# Patient Record
Sex: FEMALE | Race: BLACK OR AFRICAN AMERICAN | NOT HISPANIC OR LATINO
[De-identification: names, ages, dates, MRNs, and addresses within clinical notes are randomized per-mention and may not be internally consistent; named-entity substitution may affect disease eponyms.]

---

## 2021-12-05 ENCOUNTER — NON-APPOINTMENT (OUTPATIENT)
Age: 46
End: 2021-12-05

## 2021-12-15 ENCOUNTER — APPOINTMENT (OUTPATIENT)
Dept: OPHTHALMOLOGY | Facility: CLINIC | Age: 46
End: 2021-12-15
Payer: COMMERCIAL

## 2021-12-15 ENCOUNTER — NON-APPOINTMENT (OUTPATIENT)
Age: 46
End: 2021-12-15

## 2021-12-15 PROCEDURE — 92004 COMPRE OPH EXAM NEW PT 1/>: CPT

## 2021-12-15 PROCEDURE — 92025 CPTRIZED CORNEAL TOPOGRAPHY: CPT

## 2021-12-15 PROCEDURE — 92136 OPHTHALMIC BIOMETRY: CPT

## 2021-12-28 ENCOUNTER — TRANSCRIPTION ENCOUNTER (OUTPATIENT)
Age: 46
End: 2021-12-28

## 2022-01-07 PROBLEM — Z00.00 ENCOUNTER FOR PREVENTIVE HEALTH EXAMINATION: Status: ACTIVE | Noted: 2022-01-07

## 2022-01-09 ENCOUNTER — NON-APPOINTMENT (OUTPATIENT)
Age: 47
End: 2022-01-09

## 2022-01-10 ENCOUNTER — APPOINTMENT (OUTPATIENT)
Dept: OPHTHALMOLOGY | Facility: CLINIC | Age: 47
End: 2022-01-10
Payer: COMMERCIAL

## 2022-01-10 ENCOUNTER — NON-APPOINTMENT (OUTPATIENT)
Age: 47
End: 2022-01-10

## 2022-01-10 PROCEDURE — 76512 OPH US DX B-SCAN: CPT | Mod: LT

## 2022-01-10 PROCEDURE — 92014 COMPRE OPH EXAM EST PT 1/>: CPT

## 2022-04-04 NOTE — ASU PATIENT PROFILE, ADULT - FALL HARM RISK - UNIVERSAL INTERVENTIONS
Bed in lowest position, wheels locked, appropriate side rails in place/Call bell, personal items and telephone in reach/Instruct patient to call for assistance before getting out of bed or chair/Non-slip footwear when patient is out of bed/[place] to call system/Physically safe environment - no spills, clutter or unnecessary equipment/Purposeful Proactive Rounding/Room/bathroom lighting operational, light cord in reach

## 2022-04-04 NOTE — ASU PATIENT PROFILE, ADULT - NSICDXPASTMEDICALHX_GEN_ALL_CORE_FT
PAST MEDICAL HISTORY:  Hepatitis-C Past History    Hypercholesterolemia     Migraines     Vitamin D deficiency

## 2022-04-04 NOTE — ASU PATIENT PROFILE, ADULT - NSICDXPASTSURGICALHX_GEN_ALL_CORE_FT
PAST SURGICAL HISTORY:  No significant past surgical history PAST SURGICAL HISTORY:  H/O colonoscopy

## 2022-04-04 NOTE — ASU PATIENT PROFILE, ADULT - VISION (WITH CORRECTIVE LENSES IF THE PATIENT USUALLY WEARS THEM):
Severely impaired: cannot locate objects without hearing or touching them or patient nonresponsive. Normal vision: sees adequately in most situations; can see medication labels, newsprint Partially impaired: cannot see medication labels or newsprint, but can see obstacles in path, and the surrounding layout; can count fingers at arm's length

## 2022-04-05 ENCOUNTER — OUTPATIENT (OUTPATIENT)
Dept: OUTPATIENT SERVICES | Facility: HOSPITAL | Age: 47
LOS: 1 days | Discharge: HOME | End: 2022-04-05
Payer: COMMERCIAL

## 2022-04-05 ENCOUNTER — NON-APPOINTMENT (OUTPATIENT)
Age: 47
End: 2022-04-05

## 2022-04-05 ENCOUNTER — APPOINTMENT (OUTPATIENT)
Dept: OPHTHALMOLOGY | Facility: AMBULATORY SURGERY CENTER | Age: 47
End: 2022-04-05

## 2022-04-05 VITALS
SYSTOLIC BLOOD PRESSURE: 138 MMHG | DIASTOLIC BLOOD PRESSURE: 77 MMHG | WEIGHT: 135.58 LBS | HEIGHT: 65 IN | TEMPERATURE: 98 F | RESPIRATION RATE: 16 BRPM | OXYGEN SATURATION: 100 % | HEART RATE: 85 BPM

## 2022-04-05 VITALS
SYSTOLIC BLOOD PRESSURE: 123 MMHG | OXYGEN SATURATION: 97 % | TEMPERATURE: 97 F | RESPIRATION RATE: 16 BRPM | DIASTOLIC BLOOD PRESSURE: 75 MMHG

## 2022-04-05 DIAGNOSIS — Z98.890 OTHER SPECIFIED POSTPROCEDURAL STATES: Chronic | ICD-10-CM

## 2022-04-05 PROCEDURE — 66984 XCAPSL CTRC RMVL W/O ECP: CPT | Mod: RT

## 2022-04-05 PROCEDURE — 6698F: CPT | Mod: RT

## 2022-04-05 DEVICE — IMPLANTABLE DEVICE
Type: IMPLANTABLE DEVICE | Site: RIGHT | Status: NON-FUNCTIONAL
Removed: 2022-04-05

## 2022-04-05 RX ORDER — ERGOCALCIFEROL 1.25 MG/1
1 CAPSULE ORAL
Qty: 0 | Refills: 0 | DISCHARGE

## 2022-04-05 RX ORDER — IBUPROFEN 200 MG
0 TABLET ORAL
Qty: 0 | Refills: 0 | DISCHARGE

## 2022-04-05 RX ORDER — PHENYLEPHRINE HCL 2.5 %
1 DROPS OPHTHALMIC (EYE)
Refills: 0 | Status: COMPLETED | OUTPATIENT
Start: 2022-04-05 | End: 2022-04-05

## 2022-04-05 RX ORDER — ACETAMINOPHEN 500 MG
650 TABLET ORAL ONCE
Refills: 0 | Status: DISCONTINUED | OUTPATIENT
Start: 2022-04-05 | End: 2022-04-05

## 2022-04-05 RX ORDER — OFLOXACIN 0.3 %
1 DROPS OPHTHALMIC (EYE)
Refills: 0 | Status: COMPLETED | OUTPATIENT
Start: 2022-04-05 | End: 2022-04-05

## 2022-04-05 RX ORDER — ASCORBIC ACID 60 MG
1 TABLET,CHEWABLE ORAL
Qty: 0 | Refills: 0 | DISCHARGE

## 2022-04-05 RX ORDER — ONDANSETRON 8 MG/1
4 TABLET, FILM COATED ORAL ONCE
Refills: 0 | Status: DISCONTINUED | OUTPATIENT
Start: 2022-04-05 | End: 2022-04-05

## 2022-04-05 RX ORDER — TROPICAMIDE 1 %
1 DROPS OPHTHALMIC (EYE)
Refills: 0 | Status: COMPLETED | OUTPATIENT
Start: 2022-04-05 | End: 2022-04-05

## 2022-04-05 RX ADMIN — Medication 1 DROP(S): at 09:15

## 2022-04-05 RX ADMIN — Medication 1 DROP(S): at 09:25

## 2022-04-05 RX ADMIN — Medication 1 DROP(S): at 09:20

## 2022-04-05 RX ADMIN — Medication 1 DROP(S): at 09:16

## 2022-04-05 NOTE — OPERATIVE REPORT - OPERATIVE RPOSRT DETAILS
PATIENT: YO PUTNAM	    ACCOUNT  NUMBER:  [id number]     OPERATING SURGEON:  Dr. Tamir Campos	    ASSISTANT SURGEON:  Dr. Anabell Hicks    DATE OF SURGERY: 4/5/22    ANESTHESIA:  MAC/TOPICAL	    ESTIMATED BLOOD LOSS: < 1mL	    COMPLICATIONS:  None		    PREOPERATIVE DIAGNOSIS:  Cataract,  Right eye    POSTOPERATIVE DIAGNOSIS:  Cataract, Right eye    OPERATIVE PROCEDURE:  1. Femtosecond laser assisted laser surgery        Right eye  2. Phacoemulsification with insertion of posterior chamber intraocular lens  Right eye    LENS IMPLANT: [id number] +8.5    PROCEDURE:	The patient was brought to the laser room where certain aspects of the procedure were performed with the femtosecond laser.     The patient was then brought into the operating room and placed supine on the operating room table. After uneventful induction of neuroleptic anesthesia, lidocaine gel was instilled into the operative eye achieving sufficient anesthesia.  The patient was then prepped and draped in the usual sterile fashion.  A wire lid speculum was then inserted into the operative eye giving a wide palpebral fissure.      	A dima knife was used to perform paracenteses through clear cornea at the 12 and 6 o’clock limbal positions.  The anterior chamber was irrigated with lidocaine 1% nonpreserved followed by epinephrine 0.1% nonpreserved.  Viscoelastic was then used to maintain the anterior chamber.  A keratome was used to create a clear corneal incision just inside the temporal limbus.  Capsulorhexis forceps were used to complete the capsulorhexis. Balanced salt solution was used to hydrodissect the nucleus.  The [employer] phacoemulsification unit was then used to completely phacoemulsify the nucleus, following which an aspirating handpiece was used to aspirate all cortical remnants from the capsular bag.  Viscoelastic was  used to reform the anterior chamber and capsular bag.      	A new foldable posterior chamber intraocular lens was brought into the surgical field.  It was folded and directly injected into the capsular bag following which it was centered and secure.  All viscoelastic was then aspirated from the anterior segment using an aspirating handpiece.  All wounds were checked for leaks and there were none.  A solution consisting of 4 mg dexamethasone, 100 mg cefazolin was used to irrigate the surface of the eye.  The lid speculum was removed from the eye and a shield placed over the eye.      	The patient tolerated the procedure well and went to the recovery area in good condition.       PATIENT: YO PUTNAM	    ACCOUNT  NUMBER:  [id number]     OPERATING SURGEON:  Dr. Tamir Campos	    ASSISTANT SURGEON:  Dr. Anabell Hicks    DATE OF SURGERY: 4/5/22    ANESTHESIA:  MAC/TOPICAL	    ESTIMATED BLOOD LOSS: < 1mL	    COMPLICATIONS:  None		    PREOPERATIVE DIAGNOSIS:  Polar Cataract,  Right eye    POSTOPERATIVE DIAGNOSIS:  Polar Cataract, Right eye    OPERATIVE PROCEDURE:  1. Femtosecond laser assisted laser surgery        Right eye  2. Phacoemulsification with insertion of sulcus intraocular lens  Right eye    LENS IMPLANT: CT Kathrin 602 +7.5    PROCEDURE:	The patient was brought to the laser room where certain aspects of the procedure were performed with the femtosecond laser.     The patient was then brought into the operating room and placed supine on the operating room table. After uneventful induction of neuroleptic anesthesia, lidocaine gel was instilled into the operative eye achieving sufficient anesthesia.  The patient was then prepped and draped in the usual sterile fashion.  A wire lid speculum was then inserted into the operative eye giving a wide palpebral fissure.      	A dima knife was used to perform paracenteses through clear cornea at the 12 and 6 o’clock limbal positions.  The anterior chamber was irrigated with lidocaine 1% nonpreserved followed by epinephrine 0.1% nonpreserved.  Viscoelastic was then used to maintain the anterior chamber.  A keratome was used to create a clear corneal incision just inside the temporal limbus.  Capsulorhexis forceps were used to complete the capsulorhexis. Balanced salt solution was used to hydrodelineate the nucleus.  The [employer] phacoemulsification unit was then used to completely phacoemulsify the nucleus, following which an aspirating handpiece was used to aspirate all cortical remnants from the capsular bag. There was noted to be a posterior capsular tear, as such the decision was made to place a sulcus lens. Viscoelastic was  used to reform the anterior chamber and capsular bag. A keratome was used to slightly enlarge the main wound.    	A new foldable sulcus intraocular lens was brought into the surgical field.  It was folded and directly injected into the sulcus following which it was centered and secure.  All viscoelastic was then aspirated from the anterior segment using an aspirating handpiece. A 10-0 nylon suture was placed at the wound. All wounds were checked for leaks and there were none. No vitreous was noted at the wounds or within the anterior chamber. Miochol was instilled into the anterior chamber with notable constriction of the iris. A solution consisting of 4 mg dexamethasone, 100 mg cefazolin was used to irrigate the surface of the eye.  The lid speculum was removed from the eye and a shield placed over the eye.      	The patient tolerated the procedure well and went to the recovery area in good condition.       PATIENT: YO PUTNAM	    ACCOUNT  NUMBER:  [id number]     OPERATING SURGEON:  Dr. Tamir Campos	    ASSISTANT SURGEON:  Dr. Anabell Hicks    DATE OF SURGERY: 4/5/22    ANESTHESIA:  MAC/TOPICAL	    ESTIMATED BLOOD LOSS: < 1mL	    COMPLICATIONS:  None		    PREOPERATIVE DIAGNOSIS:  Polar Cataract,  Right eye    POSTOPERATIVE DIAGNOSIS:  Polar Cataract, Right eye    OPERATIVE PROCEDURE:  1. Femtosecond laser assisted laser surgery        Right eye  2. Phacoemulsification with insertion of sulcus intraocular lens  Right eye    LENS IMPLANT: CT Kathrin 602 +7.5    PROCEDURE:	The patient was brought to the laser room where the anterior capsulotomy and arcuate incisions were performed with the femtosecond laser.     The patient was then brought into the operating room and placed supine on the operating room table. After uneventful induction of neuroleptic anesthesia, lidocaine gel was instilled into the operative eye achieving sufficient anesthesia.  The patient was then prepped and draped in the usual sterile fashion.  A wire lid speculum was then inserted into the operative eye giving a wide palpebral fissure.      	A dima knife was used to perform paracenteses through clear cornea at the 12 and 6 o’clock limbal positions.  The anterior chamber was irrigated with lidocaine 1% nonpreserved followed by epinephrine 0.1% nonpreserved.  Viscoelastic was then used to maintain the anterior chamber.  A keratome was used to create a clear corneal incision just inside the temporal limbus.  Capsulorhexis forceps were used to complete the capsulorhexis. Balanced salt solution was used to hydrodelineate the nucleus.  The [employer] phacoemulsification unit was then used to completely phacoemulsify the nucleus, following which an aspirating handpiece was used to aspirate all cortical remnants from the capsular bag. There was noted to be a posterior capsular tear, as such the decision was made to place a sulcus lens. Viscoelastic was  used to reform the anterior chamber and capsular bag. A keratome was used to slightly enlarge the main wound.    	A new foldable sulcus intraocular lens was brought into the surgical field.  It was folded and directly injected into the sulcus following which it was centered and secure.  All viscoelastic was then aspirated from the anterior segment using an aspirating handpiece. A 10-0 nylon suture was placed at the wound. All wounds were checked for leaks and there were none. No vitreous was noted at the wounds or within the anterior chamber. Miochol was instilled into the anterior chamber with notable constriction of the iris. A solution consisting of 4 mg dexamethasone, 100 mg cefazolin was used to irrigate the surface of the eye.  The lid speculum was removed from the eye and a shield placed over the eye.      	The patient tolerated the procedure well and went to the recovery area in good condition.

## 2022-04-05 NOTE — ASU PREOP CHECKLIST - SELECT TESTS ORDERED
CBC/CMP/PT/PTT/INR/Urinalysis/HCG/EKG/COVID-19 HcG NEGATIVE/CBC/CMP/PT/PTT/INR/Urinalysis/HCG/EKG/COVID-19

## 2022-04-06 ENCOUNTER — APPOINTMENT (OUTPATIENT)
Dept: OPHTHALMOLOGY | Facility: CLINIC | Age: 47
End: 2022-04-06
Payer: COMMERCIAL

## 2022-04-06 ENCOUNTER — NON-APPOINTMENT (OUTPATIENT)
Age: 47
End: 2022-04-06

## 2022-04-06 PROCEDURE — 99024 POSTOP FOLLOW-UP VISIT: CPT

## 2022-04-13 ENCOUNTER — NON-APPOINTMENT (OUTPATIENT)
Age: 47
End: 2022-04-13

## 2022-04-13 ENCOUNTER — APPOINTMENT (OUTPATIENT)
Dept: OPHTHALMOLOGY | Facility: CLINIC | Age: 47
End: 2022-04-13
Payer: COMMERCIAL

## 2022-04-13 PROBLEM — B19.20 UNSPECIFIED VIRAL HEPATITIS C WITHOUT HEPATIC COMA: Chronic | Status: ACTIVE | Noted: 2022-04-04

## 2022-04-13 PROBLEM — E55.9 VITAMIN D DEFICIENCY, UNSPECIFIED: Chronic | Status: ACTIVE | Noted: 2022-04-04

## 2022-04-13 PROBLEM — G43.909 MIGRAINE, UNSPECIFIED, NOT INTRACTABLE, WITHOUT STATUS MIGRAINOSUS: Chronic | Status: ACTIVE | Noted: 2022-04-04

## 2022-04-13 PROBLEM — E78.00 PURE HYPERCHOLESTEROLEMIA, UNSPECIFIED: Chronic | Status: ACTIVE | Noted: 2022-04-04

## 2022-04-13 PROCEDURE — 99024 POSTOP FOLLOW-UP VISIT: CPT

## 2022-04-19 ENCOUNTER — APPOINTMENT (OUTPATIENT)
Dept: OPHTHALMOLOGY | Facility: AMBULATORY SURGERY CENTER | Age: 47
End: 2022-04-19

## 2022-04-20 ENCOUNTER — APPOINTMENT (OUTPATIENT)
Dept: OPHTHALMOLOGY | Facility: CLINIC | Age: 47
End: 2022-04-20

## 2022-04-22 ENCOUNTER — APPOINTMENT (OUTPATIENT)
Dept: OPHTHALMOLOGY | Facility: CLINIC | Age: 47
End: 2022-04-22
Payer: COMMERCIAL

## 2022-04-22 ENCOUNTER — NON-APPOINTMENT (OUTPATIENT)
Age: 47
End: 2022-04-22

## 2022-04-22 PROCEDURE — 99024 POSTOP FOLLOW-UP VISIT: CPT

## 2022-04-27 ENCOUNTER — APPOINTMENT (OUTPATIENT)
Dept: OPHTHALMOLOGY | Facility: CLINIC | Age: 47
End: 2022-04-27

## 2022-05-06 ENCOUNTER — NON-APPOINTMENT (OUTPATIENT)
Age: 47
End: 2022-05-06

## 2022-05-06 ENCOUNTER — APPOINTMENT (OUTPATIENT)
Dept: OPHTHALMOLOGY | Facility: CLINIC | Age: 47
End: 2022-05-06
Payer: COMMERCIAL

## 2022-05-06 PROCEDURE — 92201 OPSCPY EXTND RTA DRAW UNI/BI: CPT

## 2022-05-06 PROCEDURE — 99024 POSTOP FOLLOW-UP VISIT: CPT

## 2022-05-06 PROCEDURE — 92134 CPTRZ OPH DX IMG PST SGM RTA: CPT

## 2022-05-06 PROCEDURE — 92012 INTRM OPH EXAM EST PATIENT: CPT | Mod: 25

## 2022-05-18 ENCOUNTER — NON-APPOINTMENT (OUTPATIENT)
Age: 47
End: 2022-05-18

## 2022-05-18 ENCOUNTER — APPOINTMENT (OUTPATIENT)
Dept: OPHTHALMOLOGY | Facility: CLINIC | Age: 47
End: 2022-05-18
Payer: COMMERCIAL

## 2022-05-18 PROCEDURE — 92012 INTRM OPH EXAM EST PATIENT: CPT | Mod: 24

## 2022-05-18 PROCEDURE — 92134 CPTRZ OPH DX IMG PST SGM RTA: CPT

## 2022-05-27 ENCOUNTER — APPOINTMENT (OUTPATIENT)
Dept: OPHTHALMOLOGY | Facility: CLINIC | Age: 47
End: 2022-05-27
Payer: COMMERCIAL

## 2022-05-27 ENCOUNTER — NON-APPOINTMENT (OUTPATIENT)
Age: 47
End: 2022-05-27

## 2022-05-27 PROCEDURE — 99024 POSTOP FOLLOW-UP VISIT: CPT

## 2022-07-11 ENCOUNTER — NON-APPOINTMENT (OUTPATIENT)
Age: 47
End: 2022-07-11

## 2022-07-11 ENCOUNTER — APPOINTMENT (OUTPATIENT)
Dept: OPHTHALMOLOGY | Facility: CLINIC | Age: 47
End: 2022-07-11

## 2022-07-11 PROCEDURE — 92012 INTRM OPH EXAM EST PATIENT: CPT

## 2022-07-11 PROCEDURE — 92134 CPTRZ OPH DX IMG PST SGM RTA: CPT

## 2022-07-15 ENCOUNTER — APPOINTMENT (OUTPATIENT)
Dept: OPHTHALMOLOGY | Facility: CLINIC | Age: 47
End: 2022-07-15

## 2022-07-15 ENCOUNTER — NON-APPOINTMENT (OUTPATIENT)
Age: 47
End: 2022-07-15

## 2022-07-15 PROCEDURE — 92012 INTRM OPH EXAM EST PATIENT: CPT

## 2022-08-25 RX ORDER — SODIUM CHLORIDE 9 MG/ML
1000 INJECTION, SOLUTION INTRAVENOUS
Refills: 0 | Status: DISCONTINUED | OUTPATIENT
Start: 2022-08-30 | End: 2022-08-30

## 2022-08-25 RX ORDER — ACETAMINOPHEN 500 MG
650 TABLET ORAL EVERY 6 HOURS
Refills: 0 | Status: DISCONTINUED | OUTPATIENT
Start: 2022-08-30 | End: 2022-08-30

## 2022-08-25 NOTE — OPERATIVE REPORT - OPERATIVE RPOSRT DETAILS
PATIENT: YO PUTNAM	    ACCOUNT  NUMBER:  [id number]     OPERATING SURGEON:  Dr. Tamir Campos	    ASSISTANT SURGEON:  [  Dalila Sherwood MD  ]    DATE OF SURGERY:  [ 8/30/22 ]	    ANESTHESIA:  MAC/TOPICAL	    ESTIMATED BLOOD LOSS: < 1mL	    COMPLICATIONS:  [   None  ]		    PREOPERATIVE DIAGNOSIS:  Cataract,  [   left   ] eye    POSTOPERATIVE DIAGNOSIS:  Cataract, [   left   ] eye    OPERATIVE PROCEDURE:  1. Femtosecond laser assisted laser surgery  [  left    ] eye.  2. Phacoemulsification with insertion of posterior chamber intraocular lens [     left   ] eye    LENS IMPLANT: [   [id number] +10.0 D  ]    PROCEDURE:	The patient was brought to the laser room where certain aspects of the procedure were performed with the femtosecond laser.     The patient was then brought into the operating room and placed supine on the operating room table. After uneventful induction of neuroleptic anesthesia,  tetracaine was instilled into the operative eye achieving sufficient anesthesia.  The patient was then prepped and draped in the usual sterile fashion.  A wire lid speculum was then inserted into the operative eye giving a wide palpebral fissure.      	A dima knife was used to perform paracenteses through clear cornea at the 12 and 6 o’clock limbal positions.  The anterior chamber was irrigated with lidocaine 1% nonpreserved followed by epinephrine 0.1% nonpreserved.  Viscoelastic was then used to maintain the anterior chamber.  A keratome was used to create a clear corneal incision just inside the temporal limbus.  Capsulorhexis forceps were used to complete the capsulorhexis. Balanced salt solution was used to hydrodissect the nucleus.  The [employer] phacoemulsification unit was then used to completely phacoemulsify the nucleus, following which an aspirating handpiece was used to aspirate all cortical remnants from the capsular bag.  Viscoelastic was  used to reform the anterior chamber and capsular bag.      	A new foldable posterior chamber intraocular lens was brought into the surgical field.  It was folded and directly injected into the capsular bag following which it was centered and secure.  All viscoelastic was then aspirated from the anterior segment using an aspirating handpiece.  All wounds were checked for leaks and there were none.  Vigamox was instilled into the eye. The lid speculum was removed from the eye and a shield placed over the eye.      	The patient tolerated the procedure well and went to the recovery area in good condition.       PATIENT: YO PUTNAM	    ACCOUNT  NUMBER:  [id number]     OPERATING SURGEON:  Dr. Tamir Campos	    ASSISTANT SURGEON:  [  Dalila Sherwood MD  ]    DATE OF SURGERY:  [ 8/30/22 ]	    ANESTHESIA:  MAC/TOPICAL	    ESTIMATED BLOOD LOSS: < 1mL	    COMPLICATIONS:  [   None  ]		    PREOPERATIVE DIAGNOSIS:  Cataract,  [   left   ] eye    POSTOPERATIVE DIAGNOSIS:  Cataract, [   left   ] eye    OPERATIVE PROCEDURE:  1. Femtosecond laser assisted laser surgery  [  left    ] eye.  2. Phacoemulsification with insertion of sulcus chamber intraocular lens [     left   ] eye    LENS IMPLANT: [   CT Kathrin +9.0 D  ]    PROCEDURE:	The patient was brought to the laser room where certain aspects of the procedure were performed with the femtosecond laser.     The patient was then brought into the operating room and placed supine on the operating room table. After uneventful induction of neuroleptic anesthesia,  a retrobulbar block with 1:1 2% lidocaine and 0.75% marcaine was administered, achieving sufficient anesthesia.  The patient was then prepped and draped in the usual sterile fashion.  A wire lid speculum was then inserted into the operative eye giving a wide palpebral fissure.      	A dima knife was used to perform paracenteses through clear cornea at the 12 and 6 o’clock limbal positions.  The anterior chamber was irrigated with lidocaine 1% nonpreserved followed by epinephrine 0.1% nonpreserved.  Viscoelastic was then used to maintain the anterior chamber.  A keratome was used to create a clear corneal incision just inside the temporal limbus.  Capsulorhexis forceps were used to complete the capsulorhexis. Balanced salt solution was used to hydrodelineate the nucleus.  The [employer] phacoemulsification unit was then used to completely phacoemulsify the nucleus. At this point, a posterior capsular rent was noted. An aspirating handpiece was used to aspirate cortical remnants from the capsular bag. A residual posterior polar plaque remained adhered to the superior capsule. Viscoelastic was  used to reform the anterior chamber and sulcus space.      	A new foldable posterior chamber intraocular lens was brought into the surgical field.  It was folded and directly injected into the sulcus following which it was centered and secure.  All viscoelastic was then aspirated from the anterior segment using an aspirating handpiece.  The main wound was closed with a single 10- nylon suture. All wounds were checked for vitreous and there were none. All wounds were checked for leaks and there was none. Antibiotic and steroid was administered into the sub-tenons' space. The patient also received intraoperative IV dexamethasone. The lid speculum was removed from the eye and a patch and shield placed over the eye.      	The patient tolerated the procedure well and went to the recovery area in good condition.       PATIENT: YO PUTNAM	    ACCOUNT  NUMBER:  [id number]     OPERATING SURGEON:  Dr. Tamir Campos	    ASSISTANT SURGEON:  [  Dalila Sherwood MD  ]    DATE OF SURGERY:  [ 8/30/22 ]	    ANESTHESIA:  MAC/TOPICAL	    ESTIMATED BLOOD LOSS: < 1mL	    COMPLICATIONS:  [   None  ]		    PREOPERATIVE DIAGNOSIS:  Cataract,  [   left   ] eye    POSTOPERATIVE DIAGNOSIS:  Cataract, [   left   ] eye    OPERATIVE PROCEDURE:  1. Femtosecond laser assisted laser surgery  [  left    ] eye.  2. Phacoemulsification with insertion of sulcus chamber intraocular lens [     left   ] eye    LENS IMPLANT: [   CT Kathrin +9.0 D  ]    PROCEDURE:	The patient was brought to the laser room where certain aspects of the procedure were performed with the femtosecond laser.     The patient was then brought into the operating room and placed supine on the operating room table. After uneventful induction of neuroleptic anesthesia,  a retrobulbar block with 1:1 2% lidocaine and 0.75% marcaine was administered, achieving sufficient anesthesia.  The patient was then prepped and draped in the usual sterile fashion.  A wire lid speculum was then inserted into the operative eye giving a wide palpebral fissure.      	A dima knife was used to perform paracenteses through clear cornea at the 12 and 6 o’clock limbal positions.  The anterior chamber was irrigated with lidocaine 1% nonpreserved followed by epinephrine 0.1% nonpreserved.  Viscoelastic was then used to maintain the anterior chamber.  A keratome was used to create a clear corneal incision just inside the temporal limbus.  Capsulorhexis forceps were used to complete the capsulorhexis. Balanced salt solution was used to hydrodelineate the nucleus.  The [employer] phacoemulsification unit was then used to completely phacoemulsify the nucleus. At this point, a posterior capsular rent was noted. Healon was injected into the eye. An aspirating handpiece was used to aspirate cortical remnants from the capsular bag. A residual posterior polar plaque remained adhered to the superior capsule. Viscoelastic was  used to reform the anterior chamber and sulcus space.      	A new foldable posterior chamber intraocular lens was brought into the surgical field.  It was folded and directly injected into the sulcus following which it was centered and secure.  All viscoelastic was then aspirated from the anterior segment using an aspirating handpiece.  The main wound was closed with a single 10- nylon suture. All wounds were checked for vitreous and there were none. All wounds were checked for leaks and there was none. Antibiotic and steroid was administered into the sub-tenons' space. The patient also received intraoperative IV dexamethasone. The lid speculum was removed from the eye and a patch and shield placed over the eye.      	The patient tolerated the procedure well and went to the recovery area in good condition.       PATIENT: YO PUTNAM	    ACCOUNT  NUMBER:  [id number]     OPERATING SURGEON:  Dr. Tamir Campos	    ASSISTANT SURGEON:  [  Dalila Sherwood MD  ]    DATE OF SURGERY:  [ 8/30/22 ]	    ANESTHESIA:  MAC/TOPICAL	    ESTIMATED BLOOD LOSS: < 1mL	    COMPLICATIONS:  [   None  ]		    PREOPERATIVE DIAGNOSIS:  Polar Cataract,  [   left   ] eye    POSTOPERATIVE DIAGNOSIS:  Polar Cataract, [   left   ] eye    OPERATIVE PROCEDURE:  1. Femtosecond laser assisted laser surgery  [  left    ] eye.  2. Phacoemulsification with insertion of sulcus chamber intraocular lens [     left   ] eye    LENS IMPLANT: [   CT Kathrin +9.0 D  ]    PROCEDURE:	The patient was brought to the laser room where certain aspects of the procedure were performed with the femtosecond laser.     The patient was then brought into the operating room and placed supine on the operating room table. After uneventful induction of neuroleptic anesthesia,  a retrobulbar block with 1:1 2% lidocaine and 0.75% marcaine was administered, achieving sufficient anesthesia.  The patient was then prepped and draped in the usual sterile fashion.  A wire lid speculum was then inserted into the operative eye giving a wide palpebral fissure.      	A dima knife was used to perform paracenteses through clear cornea at the 12 and 6 o’clock limbal positions.  The anterior chamber was irrigated with lidocaine 1% nonpreserved followed by epinephrine 0.1% nonpreserved.  Viscoelastic was then used to maintain the anterior chamber.  A keratome was used to create a clear corneal incision just inside the temporal limbus.  Capsulorhexis forceps were used to complete the capsulorhexis. Balanced salt solution was used to hydrodelineate the nucleus.  The [employer] phacoemulsification unit was then used to completely phacoemulsify the nucleus. The I/A handpiece was used to gently peel epinucleus and cortex off the capsule. As the posterior polar opacity was being aspirated, a posterior capsular rent was noted. Healon was injected into the eye. There was no evidence of vitreous prolapse. An aspirating handpiece was used to aspirate cortical remnants from the capsular bag. Viscoelastic was  used to reform the anterior chamber and sulcus space.      	A new foldable posterior chamber intraocular lens was brought into the surgical field.  It was folded and directly injected into the sulcus following which it was centered and secure.  All viscoelastic was then aspirated from the anterior segment using an aspirating handpiece.  Miochol was injected yielding pupillary miosis. The main wound was closed with a single 10- nylon suture. All wounds were checked for vitreous and there were none. All wounds were checked for leaks and there was none. Antibiotic and steroid was administered into the sub-tenons' space. The patient also received intraoperative IV dexamethasone. The lid speculum was removed from the eye and a patch and shield placed over the eye.      	The patient tolerated the procedure well and went to the recovery area in good condition.

## 2022-08-29 NOTE — ASU PATIENT PROFILE, ADULT - NS PREOP UNDERSTANDS INFO
leave valuables/jewelry/contacts at home, make sure to have escort 18+, bring photo ID, insurance card + covid vax card, no solid foods after midnight, water/apple juice/gatorade until 1215/yes

## 2022-08-30 ENCOUNTER — NON-APPOINTMENT (OUTPATIENT)
Age: 47
End: 2022-08-30

## 2022-08-30 ENCOUNTER — APPOINTMENT (OUTPATIENT)
Dept: OPHTHALMOLOGY | Facility: AMBULATORY SURGERY CENTER | Age: 47
End: 2022-08-30

## 2022-08-30 ENCOUNTER — OUTPATIENT (OUTPATIENT)
Dept: OUTPATIENT SERVICES | Facility: HOSPITAL | Age: 47
LOS: 1 days | Discharge: HOME | End: 2022-08-30

## 2022-08-30 ENCOUNTER — TRANSCRIPTION ENCOUNTER (OUTPATIENT)
Age: 47
End: 2022-08-30

## 2022-08-30 VITALS
SYSTOLIC BLOOD PRESSURE: 113 MMHG | HEART RATE: 72 BPM | DIASTOLIC BLOOD PRESSURE: 63 MMHG | OXYGEN SATURATION: 99 % | TEMPERATURE: 97 F | RESPIRATION RATE: 16 BRPM

## 2022-08-30 VITALS
HEART RATE: 75 BPM | RESPIRATION RATE: 16 BRPM | OXYGEN SATURATION: 100 % | TEMPERATURE: 97 F | SYSTOLIC BLOOD PRESSURE: 137 MMHG | DIASTOLIC BLOOD PRESSURE: 74 MMHG | HEIGHT: 65 IN | WEIGHT: 133.16 LBS

## 2022-08-30 DIAGNOSIS — Z98.890 OTHER SPECIFIED POSTPROCEDURAL STATES: Chronic | ICD-10-CM

## 2022-08-30 DIAGNOSIS — H26.9 UNSPECIFIED CATARACT: Chronic | ICD-10-CM

## 2022-08-30 PROCEDURE — 66984 XCAPSL CTRC RMVL W/O ECP: CPT | Mod: LT

## 2022-08-30 PROCEDURE — 6698F: CPT | Mod: LT

## 2022-08-30 DEVICE — IMPLANTABLE DEVICE
Type: IMPLANTABLE DEVICE | Site: LEFT | Status: NON-FUNCTIONAL
Removed: 2022-08-30

## 2022-08-30 RX ORDER — CYCLOPENTOLATE HYDROCHLORIDE 10 MG/ML
1 SOLUTION/ DROPS OPHTHALMIC
Refills: 0 | Status: COMPLETED | OUTPATIENT
Start: 2022-08-30 | End: 2022-08-30

## 2022-08-30 RX ORDER — ACETAMINOPHEN 500 MG
650 TABLET ORAL ONCE
Refills: 0 | Status: DISCONTINUED | OUTPATIENT
Start: 2022-08-30 | End: 2022-08-30

## 2022-08-30 RX ORDER — ONDANSETRON 8 MG/1
4 TABLET, FILM COATED ORAL ONCE
Refills: 0 | Status: DISCONTINUED | OUTPATIENT
Start: 2022-08-30 | End: 2022-08-30

## 2022-08-30 RX ORDER — TROPICAMIDE 1 %
1 DROPS OPHTHALMIC (EYE)
Refills: 0 | Status: COMPLETED | OUTPATIENT
Start: 2022-08-30 | End: 2022-08-30

## 2022-08-30 RX ORDER — OFLOXACIN 0.3 %
1 DROPS OPHTHALMIC (EYE)
Refills: 0 | Status: COMPLETED | OUTPATIENT
Start: 2022-08-30 | End: 2022-08-30

## 2022-08-30 RX ORDER — PHENYLEPHRINE HCL 2.5 %
1 DROPS OPHTHALMIC (EYE)
Refills: 0 | Status: COMPLETED | OUTPATIENT
Start: 2022-08-30 | End: 2022-08-30

## 2022-08-30 RX ADMIN — Medication 1 DROP(S): at 14:45

## 2022-08-30 RX ADMIN — CYCLOPENTOLATE HYDROCHLORIDE 1 DROP(S): 10 SOLUTION/ DROPS OPHTHALMIC at 14:50

## 2022-08-30 RX ADMIN — Medication 1 DROP(S): at 14:50

## 2022-08-30 RX ADMIN — CYCLOPENTOLATE HYDROCHLORIDE 1 DROP(S): 10 SOLUTION/ DROPS OPHTHALMIC at 14:45

## 2022-08-30 RX ADMIN — CYCLOPENTOLATE HYDROCHLORIDE 1 DROP(S): 10 SOLUTION/ DROPS OPHTHALMIC at 14:39

## 2022-08-30 RX ADMIN — Medication 1 DROP(S): at 14:39

## 2022-08-30 RX ADMIN — Medication 1 DROP(S): at 14:40

## 2022-08-30 NOTE — PRE-ANESTHESIA EVALUATION ADULT - NSANTHOSAYNRD_GEN_A_CORE
No. CHAY screening performed.  STOP BANG Legend: 0-2 = LOW Risk; 3-4 = INTERMEDIATE Risk; 5-8 = HIGH Risk

## 2022-08-30 NOTE — ASU DISCHARGE PLAN (ADULT/PEDIATRIC) - NS MD DC FALL RISK RISK
For information on Fall & Injury Prevention, visit: [email or web address] OR  [email or web address] OR  https://www.cdc.gov/steadi/patient.html

## 2022-08-31 ENCOUNTER — APPOINTMENT (OUTPATIENT)
Dept: OPHTHALMOLOGY | Facility: CLINIC | Age: 47
End: 2022-08-31

## 2022-08-31 ENCOUNTER — NON-APPOINTMENT (OUTPATIENT)
Age: 47
End: 2022-08-31

## 2022-08-31 PROCEDURE — 76512 OPH US DX B-SCAN: CPT | Mod: LT

## 2022-09-07 ENCOUNTER — APPOINTMENT (OUTPATIENT)
Dept: OPHTHALMOLOGY | Facility: CLINIC | Age: 47
End: 2022-09-07

## 2022-09-07 ENCOUNTER — NON-APPOINTMENT (OUTPATIENT)
Age: 47
End: 2022-09-07

## 2022-09-07 PROCEDURE — 99024 POSTOP FOLLOW-UP VISIT: CPT

## 2022-09-28 ENCOUNTER — NON-APPOINTMENT (OUTPATIENT)
Age: 47
End: 2022-09-28

## 2022-09-28 ENCOUNTER — APPOINTMENT (OUTPATIENT)
Dept: OPHTHALMOLOGY | Facility: CLINIC | Age: 47
End: 2022-09-28

## 2022-09-28 PROCEDURE — 92134 CPTRZ OPH DX IMG PST SGM RTA: CPT

## 2022-09-28 PROCEDURE — 92012 INTRM OPH EXAM EST PATIENT: CPT | Mod: 24

## 2022-09-28 PROCEDURE — 99024 POSTOP FOLLOW-UP VISIT: CPT

## 2022-10-19 ENCOUNTER — APPOINTMENT (OUTPATIENT)
Dept: OPHTHALMOLOGY | Facility: CLINIC | Age: 47
End: 2022-10-19

## 2022-10-19 ENCOUNTER — NON-APPOINTMENT (OUTPATIENT)
Age: 47
End: 2022-10-19

## 2022-10-19 PROCEDURE — 92134 CPTRZ OPH DX IMG PST SGM RTA: CPT

## 2022-10-19 PROCEDURE — 99024 POSTOP FOLLOW-UP VISIT: CPT

## 2022-10-19 PROCEDURE — 92012 INTRM OPH EXAM EST PATIENT: CPT | Mod: 24

## 2022-11-16 ENCOUNTER — NON-APPOINTMENT (OUTPATIENT)
Age: 47
End: 2022-11-16

## 2022-11-16 ENCOUNTER — APPOINTMENT (OUTPATIENT)
Dept: OPHTHALMOLOGY | Facility: CLINIC | Age: 47
End: 2022-11-16

## 2022-11-16 PROCEDURE — 92012 INTRM OPH EXAM EST PATIENT: CPT | Mod: 24

## 2022-11-16 PROCEDURE — 92134 CPTRZ OPH DX IMG PST SGM RTA: CPT

## 2022-11-16 PROCEDURE — 99024 POSTOP FOLLOW-UP VISIT: CPT

## 2022-12-16 ENCOUNTER — NON-APPOINTMENT (OUTPATIENT)
Age: 47
End: 2022-12-16

## 2022-12-16 ENCOUNTER — APPOINTMENT (OUTPATIENT)
Dept: OPHTHALMOLOGY | Facility: CLINIC | Age: 47
End: 2022-12-16

## 2022-12-16 PROCEDURE — 92012 INTRM OPH EXAM EST PATIENT: CPT

## 2022-12-21 ENCOUNTER — NON-APPOINTMENT (OUTPATIENT)
Age: 47
End: 2022-12-21

## 2022-12-21 ENCOUNTER — APPOINTMENT (OUTPATIENT)
Dept: OPHTHALMOLOGY | Facility: CLINIC | Age: 47
End: 2022-12-21

## 2022-12-21 PROCEDURE — 92012 INTRM OPH EXAM EST PATIENT: CPT

## 2022-12-21 PROCEDURE — 92134 CPTRZ OPH DX IMG PST SGM RTA: CPT

## 2023-02-13 ENCOUNTER — APPOINTMENT (OUTPATIENT)
Dept: OPHTHALMOLOGY | Facility: CLINIC | Age: 48
End: 2023-02-13
Payer: COMMERCIAL

## 2023-02-13 ENCOUNTER — NON-APPOINTMENT (OUTPATIENT)
Age: 48
End: 2023-02-13

## 2023-02-13 PROCEDURE — 92012 INTRM OPH EXAM EST PATIENT: CPT

## 2023-04-26 ENCOUNTER — NON-APPOINTMENT (OUTPATIENT)
Age: 48
End: 2023-04-26

## 2023-04-26 ENCOUNTER — APPOINTMENT (OUTPATIENT)
Dept: OPHTHALMOLOGY | Facility: CLINIC | Age: 48
End: 2023-04-26
Payer: COMMERCIAL

## 2023-04-26 PROCEDURE — 92201 OPSCPY EXTND RTA DRAW UNI/BI: CPT

## 2023-04-26 PROCEDURE — 92012 INTRM OPH EXAM EST PATIENT: CPT

## 2023-04-26 PROCEDURE — 92134 CPTRZ OPH DX IMG PST SGM RTA: CPT

## 2023-11-30 ENCOUNTER — APPOINTMENT (OUTPATIENT)
Dept: OPHTHALMOLOGY | Facility: CLINIC | Age: 48
End: 2023-11-30
Payer: COMMERCIAL

## 2023-11-30 ENCOUNTER — NON-APPOINTMENT (OUTPATIENT)
Age: 48
End: 2023-11-30

## 2023-11-30 PROCEDURE — 92134 CPTRZ OPH DX IMG PST SGM RTA: CPT

## 2023-11-30 PROCEDURE — 92014 COMPRE OPH EXAM EST PT 1/>: CPT

## 2023-11-30 PROCEDURE — 92201 OPSCPY EXTND RTA DRAW UNI/BI: CPT

## 2023-12-11 ENCOUNTER — APPOINTMENT (OUTPATIENT)
Dept: OPHTHALMOLOGY | Facility: CLINIC | Age: 48
End: 2023-12-11
Payer: COMMERCIAL

## 2023-12-11 ENCOUNTER — NON-APPOINTMENT (OUTPATIENT)
Age: 48
End: 2023-12-11

## 2023-12-11 PROCEDURE — 92201 OPSCPY EXTND RTA DRAW UNI/BI: CPT

## 2023-12-11 PROCEDURE — 92012 INTRM OPH EXAM EST PATIENT: CPT

## 2024-02-01 ENCOUNTER — NON-APPOINTMENT (OUTPATIENT)
Age: 49
End: 2024-02-01

## 2024-02-01 ENCOUNTER — APPOINTMENT (OUTPATIENT)
Dept: OPHTHALMOLOGY | Facility: CLINIC | Age: 49
End: 2024-02-01
Payer: COMMERCIAL

## 2024-02-01 PROCEDURE — 92012 INTRM OPH EXAM EST PATIENT: CPT

## 2024-02-01 PROCEDURE — 92201 OPSCPY EXTND RTA DRAW UNI/BI: CPT

## 2024-03-01 NOTE — ASU DISCHARGE PLAN (ADULT/PEDIATRIC) - FREQUENT HAND WASHING PREVENTS THE SPREAD OF INFECTION.
Head,  normocephalic,  atraumatic,  Face,  Face within normal limits,  Ears,  External ears within normal limits,  Nose/Nasopharynx,  External nose  normal appearance,  nares patent,  no nasal discharge,  Mouth and Throat,  Oral cavity appearance normal,  Lips,  Appearance normal Statement Selected

## 2024-08-05 ENCOUNTER — NON-APPOINTMENT (OUTPATIENT)
Age: 49
End: 2024-08-05

## 2024-08-05 ENCOUNTER — APPOINTMENT (OUTPATIENT)
Dept: OPHTHALMOLOGY | Facility: CLINIC | Age: 49
End: 2024-08-05

## 2024-08-05 PROCEDURE — 92014 COMPRE OPH EXAM EST PT 1/>: CPT

## 2024-08-05 PROCEDURE — 92201 OPSCPY EXTND RTA DRAW UNI/BI: CPT

## 2024-08-05 PROCEDURE — 92134 CPTRZ OPH DX IMG PST SGM RTA: CPT

## 2025-08-04 ENCOUNTER — NON-APPOINTMENT (OUTPATIENT)
Age: 50
End: 2025-08-04

## 2025-08-04 ENCOUNTER — APPOINTMENT (OUTPATIENT)
Dept: OPHTHALMOLOGY | Facility: CLINIC | Age: 50
End: 2025-08-04
Payer: COMMERCIAL

## 2025-08-04 PROCEDURE — 92134 CPTRZ OPH DX IMG PST SGM RTA: CPT

## 2025-08-04 PROCEDURE — 92014 COMPRE OPH EXAM EST PT 1/>: CPT

## (undated) DEVICE — SUT NYLON 10-0 12" CU-5

## (undated) DEVICE — SPECIMEN CONTAINER 4OZ

## (undated) DEVICE — DRSG CURITY GAUZE SPONGE 4 X 4" 12-PLY

## (undated) DEVICE — CANNULA ANT CHMBR 27GX22MM

## (undated) DEVICE — GLV 8 PROTEXIS (WHITE)

## (undated) DEVICE — NDL HYPO SAFE 25G X 5/8" (ORANGE)

## (undated) DEVICE — PACK ANTERIOR SEGMENT

## (undated) DEVICE — WARMING BLANKET LOWER ADULT

## (undated) DEVICE — CANNULA IRR ANT CHAMBER 30G

## (undated) DEVICE — NDL RETROBULBAR VISITEC 25X1.5

## (undated) DEVICE — SOL IRR BAL SALT 500ML

## (undated) DEVICE — VENODYNE/SCD SLEEVE CALF MEDIUM

## (undated) DEVICE — Device

## (undated) DEVICE — SUT POLYSORB 8-0 5" MV-135-5

## (undated) DEVICE — DRAPE MICROSCOPE KNOB COVER SMALL (2 PCS)

## (undated) DEVICE — KIT CENTURION ANTERIOR

## (undated) DEVICE — SPECIMEN CONTAINER 100ML

## (undated) DEVICE — SYR LUER LOK 3CC

## (undated) DEVICE — APPLICATOR Q TIP 6" WOOD STEM

## (undated) DEVICE — CANNULA MEDONE DUAL BORE SIDEFLO 25G

## (undated) DEVICE — TIP BACKFLUSH SOFT DISP 23GA

## (undated) DEVICE — MARKING PEN W RULER

## (undated) DEVICE — PICK MICRO .6MM 23G

## (undated) DEVICE — APPLICATOR COTTON TIP 6"

## (undated) DEVICE — CATARACT KIT

## (undated) DEVICE — KNIFE ALCON STANDARD FULL HANDLE 15 DEG (PINK)

## (undated) DEVICE — PACK CENTURION 2.4MM

## (undated) DEVICE — PACK VITRECTOMY  LF

## (undated) DEVICE — GLV 7.5 PROTEXIS (WHITE)

## (undated) DEVICE — ELCTR WETFIELD ERASER FINE TIP 25GA

## (undated) DEVICE — ILM FORCEP 25G PLUS